# Patient Record
Sex: FEMALE | URBAN - METROPOLITAN AREA
[De-identification: names, ages, dates, MRNs, and addresses within clinical notes are randomized per-mention and may not be internally consistent; named-entity substitution may affect disease eponyms.]

---

## 2022-12-06 ENCOUNTER — APPOINTMENT (RX ONLY)
Dept: URBAN - METROPOLITAN AREA CLINIC 23 | Facility: CLINIC | Age: 42
Setting detail: DERMATOLOGY
End: 2022-12-06

## 2022-12-06 DIAGNOSIS — Z41.9 ENCOUNTER FOR PROCEDURE FOR PURPOSES OTHER THAN REMEDYING HEALTH STATE, UNSPECIFIED: ICD-10-CM

## 2022-12-06 PROCEDURE — ? DYSPORT

## 2022-12-06 PROCEDURE — ? FILLERS

## 2022-12-06 NOTE — PROCEDURE: FILLERS
Vermilion Lips Filler Volume In Cc: 0
Anesthesia Volume In Cc: 0.5
Number Of Syringes (Required For Inventory): 1
Additional Area 1 Location: lateral cheeks, lateral jawline,oral commesure
Anesthesia Type: 1% lidocaine with epinephrine
Include Cannula Information In Note?: No
Map Statment: See [address] for Complete Details
Filler: Ashlee Blancas
Consent: Written consent obtained. Risks include but not limited to bruising, beading, irregular texture, ulceration, infection, allergic reaction, scar formation, incomplete augmentation, temporary nature, procedural pain.
Post-Care Instructions: Patient instructed to apply ice to reduce swelling.
Additional Area 1 Location: lateral mouth, perioral fine lines, marionette lines.
Show Inventory Tab: Show
Detail Level: Detailed
Inventory Information: This plan will send filler information to inventory based on the fillers you select. Multiple fillers can be sent but you must ensure you select the appropriate fillers in the inventory tab.
Additional Anesthesia Volume In Cc: 6

## 2022-12-06 NOTE — PROCEDURE: DYSPORT
Additional Area 1 Units: 10
Show Additional Area 5: Yes
Show Lcl Units: No
Inferior Lateral Orbicularis Oculi Units: 0
Additional Area 2 Location: crows feet
Show Inventory Tab: Show
Lot #: [id number]
Consent: Written consent obtained. Risks include but not limited to lid/brow ptosis, bruising, swelling, diplopia, temporary effect, incomplete chemical denervation.
Additional Area 3 Location: glabella
Dilution (U/ 0.1cc): 1.5
Additional Area 4 Location: 2cm high forehead
Expiration Date (Month Year): 2022-08
Price (Use Numbers Only, No Special Characters Or $): 0.00
Additional Area 1 Location: lat brows
Glabellar Complex Units: 48
Detail Level: Simple